# Patient Record
Sex: FEMALE | Race: WHITE | NOT HISPANIC OR LATINO | URBAN - METROPOLITAN AREA
[De-identification: names, ages, dates, MRNs, and addresses within clinical notes are randomized per-mention and may not be internally consistent; named-entity substitution may affect disease eponyms.]

---

## 2017-04-07 ENCOUNTER — OUTPATIENT (OUTPATIENT)
Dept: OUTPATIENT SERVICES | Facility: HOSPITAL | Age: 57
LOS: 1 days | Discharge: HOME | End: 2017-04-07

## 2017-06-27 DIAGNOSIS — S83.90XA SPRAIN OF UNSPECIFIED SITE OF UNSPECIFIED KNEE, INITIAL ENCOUNTER: ICD-10-CM

## 2018-07-18 ENCOUNTER — OUTPATIENT (OUTPATIENT)
Dept: OUTPATIENT SERVICES | Facility: HOSPITAL | Age: 58
LOS: 1 days | Discharge: HOME | End: 2018-07-18

## 2018-07-18 DIAGNOSIS — Z12.31 ENCOUNTER FOR SCREENING MAMMOGRAM FOR MALIGNANT NEOPLASM OF BREAST: ICD-10-CM

## 2018-09-01 ENCOUNTER — OUTPATIENT (OUTPATIENT)
Dept: OUTPATIENT SERVICES | Facility: HOSPITAL | Age: 58
LOS: 1 days | Discharge: HOME | End: 2018-09-01

## 2018-09-01 DIAGNOSIS — G47.30 SLEEP APNEA, UNSPECIFIED: ICD-10-CM

## 2018-09-01 DIAGNOSIS — R30.0 DYSURIA: ICD-10-CM

## 2018-09-01 DIAGNOSIS — E04.2 NONTOXIC MULTINODULAR GOITER: ICD-10-CM

## 2018-09-01 DIAGNOSIS — E66.01 MORBID (SEVERE) OBESITY DUE TO EXCESS CALORIES: ICD-10-CM

## 2018-10-16 ENCOUNTER — TRANSCRIPTION ENCOUNTER (OUTPATIENT)
Age: 58
End: 2018-10-16

## 2019-09-30 PROBLEM — Z00.00 ENCOUNTER FOR PREVENTIVE HEALTH EXAMINATION: Status: ACTIVE | Noted: 2019-09-30

## 2019-10-31 ENCOUNTER — APPOINTMENT (OUTPATIENT)
Dept: UROLOGY | Facility: CLINIC | Age: 59
End: 2019-10-31
Payer: COMMERCIAL

## 2019-10-31 DIAGNOSIS — Z80.49 FAMILY HISTORY OF MALIGNANT NEOPLASM OF OTHER GENITAL ORGANS: ICD-10-CM

## 2019-10-31 DIAGNOSIS — Z86.69 PERSONAL HISTORY OF OTHER DISEASES OF THE NERVOUS SYSTEM AND SENSE ORGANS: ICD-10-CM

## 2019-10-31 DIAGNOSIS — Z78.9 OTHER SPECIFIED HEALTH STATUS: ICD-10-CM

## 2019-10-31 DIAGNOSIS — Z80.42 FAMILY HISTORY OF MALIGNANT NEOPLASM OF PROSTATE: ICD-10-CM

## 2019-10-31 PROCEDURE — 99204 OFFICE O/P NEW MOD 45 MIN: CPT

## 2019-10-31 RX ORDER — MIRABEGRON 25 MG/1
25 TABLET, FILM COATED, EXTENDED RELEASE ORAL
Qty: 90 | Refills: 3 | Status: ACTIVE | COMMUNITY
Start: 2019-10-31 | End: 1900-01-01

## 2019-10-31 RX ORDER — BUPROPION HYDROCHLORIDE 100 MG/1
TABLET, FILM COATED ORAL
Refills: 0 | Status: ACTIVE | COMMUNITY

## 2019-10-31 NOTE — HISTORY OF PRESENT ILLNESS
[FreeTextEntry1] : TREVOR MARTIN is a 59 year old female P3 who presents with urinary incontinence, OAB. \par Patient reports in the last few years she has had worsening urinary incontinence. Reports urinary incontinence with stress maneuvers. Uses 3ppd\par Also complains of increasing frequency every one to 2 hours and urgency. Denies urge incontinence and has nocturia one time.\par She tried VESIcare and had some improvement in symptoms however stopped his medicine because she had recurring urinary infections ~3.\par Denies gross hematuria, dysuria or associated symptoms. 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

## 2019-10-31 NOTE — ASSESSMENT
[FreeTextEntry1] : TREVOR MARTIN is a 59 year old female P3 who presents with urinary incontinence, OAB, recurrent UTIs.\par Stress urinary incontinence is most bothersome at this point therefore recommending kegels, weight loss. If no improvement referral to DR Morataya consider sling\par Decrease bladder irritants\par Urinalysis urine culture\par Renal bladder ultrasound\par Mirabegron 25 mg, discussed htn side effect\par Theracran for recurrent UTIs. consider vaginal estrogen in future

## 2019-11-01 ENCOUNTER — LABORATORY RESULT (OUTPATIENT)
Age: 59
End: 2019-11-01

## 2019-11-01 ENCOUNTER — OUTPATIENT (OUTPATIENT)
Dept: OUTPATIENT SERVICES | Facility: HOSPITAL | Age: 59
LOS: 1 days | Discharge: HOME | End: 2019-11-01

## 2019-11-01 DIAGNOSIS — R35.0 FREQUENCY OF MICTURITION: ICD-10-CM

## 2019-11-01 LAB
APPEARANCE: ABNORMAL
BILIRUBIN URINE: NEGATIVE
BLOOD URINE: NEGATIVE
COLOR: NORMAL
GLUCOSE QUALITATIVE U: NEGATIVE
KETONES URINE: NEGATIVE
LEUKOCYTE ESTERASE URINE: NEGATIVE
NITRITE URINE: NEGATIVE
PH URINE: 8
PROTEIN URINE: NEGATIVE
SPECIFIC GRAVITY URINE: 1.01
UROBILINOGEN URINE: NORMAL

## 2019-11-04 LAB — BACTERIA UR CULT: ABNORMAL

## 2019-11-15 ENCOUNTER — MESSAGE (OUTPATIENT)
Age: 59
End: 2019-11-15

## 2019-11-21 ENCOUNTER — MESSAGE (OUTPATIENT)
Age: 59
End: 2019-11-21

## 2019-12-13 ENCOUNTER — FORM ENCOUNTER (OUTPATIENT)
Age: 59
End: 2019-12-13

## 2019-12-14 ENCOUNTER — OUTPATIENT (OUTPATIENT)
Dept: OUTPATIENT SERVICES | Facility: HOSPITAL | Age: 59
LOS: 1 days | Discharge: HOME | End: 2019-12-14
Payer: COMMERCIAL

## 2019-12-14 DIAGNOSIS — R35.0 FREQUENCY OF MICTURITION: ICD-10-CM

## 2019-12-14 PROCEDURE — 76770 US EXAM ABDO BACK WALL COMP: CPT | Mod: 26

## 2020-01-30 ENCOUNTER — APPOINTMENT (OUTPATIENT)
Dept: UROLOGY | Facility: CLINIC | Age: 60
End: 2020-01-30
Payer: COMMERCIAL

## 2020-01-30 DIAGNOSIS — R39.15 URGENCY OF URINATION: ICD-10-CM

## 2020-01-30 DIAGNOSIS — R35.0 FREQUENCY OF MICTURITION: ICD-10-CM

## 2020-01-30 DIAGNOSIS — N39.3 STRESS INCONTINENCE (FEMALE) (MALE): ICD-10-CM

## 2020-01-30 PROCEDURE — 99214 OFFICE O/P EST MOD 30 MIN: CPT

## 2020-01-30 NOTE — HISTORY OF PRESENT ILLNESS
[FreeTextEntry1] : TREVOR MARTIN is a 59 year old female P3 who presents with urinary incontinence, OAB, recurrent UTIs.\par Last visit we started mirabegron 25 mg and pt reports significant improvement.\par Urinary frequency and urgency has improved. Voids every 1.5 hours without urgency. Using 1-2 packs per day from 3 packs per day prior. No side effects.Still having YONG>UUI\par  \par Renal bladder ultrasound images visualized reviewed there are no kidney tumors no hydronephrosis no stones. Postvoid residual 67 mL.\par UA no microheme\par UCx asymptomatic bacteriuria not txed\par Prev- She tried VESIcare and had some improvement in symptoms however stopped his medicine because she had recurring urinary infections ~3.\par Denies gross hematuria, dysuria or associated symptoms. 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

## 2020-01-30 NOTE — ASSESSMENT
[FreeTextEntry1] : TREVOR MARTIN is a 59 year old female P3 who presents with urinary incontinence, OAB, recurrent UTIs.\par Last visit we started mirabegron 25 mg and pt reports significant improvement.\par /70 today\par Stress urinary incontinence is most bothersome at this point therefore recommending kegels, weight loss. If no improvement referral to DR Morataya consider sling, bulking agents.\par Decrease bladder irritants\par cont kegels\par wt loss\par Fu prn , pcp appt given

## 2020-10-26 PROBLEM — Z86.39 HISTORY OF THYROID NODULE: Status: RESOLVED | Noted: 2020-10-26 | Resolved: 2020-10-26

## 2020-10-26 PROBLEM — Z87.448 HISTORY OF STRESS INCONTINENCE: Status: RESOLVED | Noted: 2020-10-26 | Resolved: 2020-10-26

## 2020-10-27 ENCOUNTER — APPOINTMENT (OUTPATIENT)
Dept: GYNECOLOGIC ONCOLOGY | Facility: CLINIC | Age: 60
End: 2020-10-27
Payer: COMMERCIAL

## 2020-10-27 VITALS
TEMPERATURE: 98.7 F | HEIGHT: 67 IN | DIASTOLIC BLOOD PRESSURE: 86 MMHG | SYSTOLIC BLOOD PRESSURE: 134 MMHG | WEIGHT: 290 LBS | BODY MASS INDEX: 45.52 KG/M2

## 2020-10-27 DIAGNOSIS — Z87.448 PERSONAL HISTORY OF OTHER DISEASES OF URINARY SYSTEM: ICD-10-CM

## 2020-10-27 DIAGNOSIS — Z86.39 PERSONAL HISTORY OF OTHER ENDOCRINE, NUTRITIONAL AND METABOLIC DISEASE: ICD-10-CM

## 2020-10-27 DIAGNOSIS — N39.0 URINARY TRACT INFECTION, SITE NOT SPECIFIED: ICD-10-CM

## 2020-10-27 DIAGNOSIS — N32.81 OVERACTIVE BLADDER: ICD-10-CM

## 2020-10-27 DIAGNOSIS — N95.0 POSTMENOPAUSAL BLEEDING: ICD-10-CM

## 2020-10-27 DIAGNOSIS — R93.89 ABNORMAL FINDINGS ON DIAGNOSTIC IMAGING OF OTHER SPECIFIED BODY STRUCTURES: ICD-10-CM

## 2020-10-27 DIAGNOSIS — R35.0 FREQUENCY OF MICTURITION: ICD-10-CM

## 2020-10-27 PROCEDURE — 99072 ADDL SUPL MATRL&STAF TM PHE: CPT

## 2020-10-27 PROCEDURE — 99204 OFFICE O/P NEW MOD 45 MIN: CPT

## 2020-10-27 NOTE — HISTORY OF PRESENT ILLNESS
[FreeTextEntry1] : 60 year old female   via , referred by Dr. Mandi Oliva for post menopausal bleeding and a thickened endometrium  to 8mm.  Dr. Oliva performed an endometrial biopsy in her office but was unable to obtain endometrial cells   Patient had  x3. Menarche at age 9 / Menopause age 56.  \par Patient concerned since her Mother had a history of endometrial cancer. Her CA-125 is negative (13). \par However she reports a positive history of PMB in  and again in . she is here to discuss her options for further management.

## 2020-10-27 NOTE — PAST MEDICAL HISTORY
[Postmenopausal] : The patient is postmenopausal [Menarche Age ____] : age at menarche was [unfilled] [Menopause Age____] : age at menopause was [unfilled] [Approximately ___] : the LMP was approximately [unfilled] [Total Preg ___] : G[unfilled] [Live Births ___] : P[unfilled]  [Full Term ___] : Full Term: [unfilled] [Living ___] : Living: [unfilled] [FreeTextEntry5] :  x3

## 2020-11-19 ENCOUNTER — TRANSCRIPTION ENCOUNTER (OUTPATIENT)
Age: 60
End: 2020-11-19

## 2020-11-19 ENCOUNTER — APPOINTMENT (OUTPATIENT)
Dept: GASTROENTEROLOGY | Facility: CLINIC | Age: 60
End: 2020-11-19
Payer: COMMERCIAL

## 2020-11-19 DIAGNOSIS — K62.5 HEMORRHAGE OF ANUS AND RECTUM: ICD-10-CM

## 2020-11-19 DIAGNOSIS — R19.7 DIARRHEA, UNSPECIFIED: ICD-10-CM

## 2020-11-19 DIAGNOSIS — K58.1 IRRITABLE BOWEL SYNDROME WITH CONSTIPATION: ICD-10-CM

## 2020-11-19 DIAGNOSIS — R10.9 UNSPECIFIED ABDOMINAL PAIN: ICD-10-CM

## 2020-11-19 PROCEDURE — 99204 OFFICE O/P NEW MOD 45 MIN: CPT | Mod: 95

## 2020-11-19 RX ORDER — HYOSCYAMINE SULFATE 0.12 MG/1
0.12 TABLET ORAL
Refills: 0 | Status: ACTIVE | COMMUNITY

## 2020-11-19 RX ORDER — DICYCLOMINE HYDROCHLORIDE 10 MG/1
10 CAPSULE ORAL 3 TIMES DAILY
Qty: 90 | Refills: 3 | Status: ACTIVE | COMMUNITY
Start: 2020-11-19 | End: 1900-01-01

## 2020-11-19 NOTE — PHYSICAL EXAM
[General Appearance - Alert] : alert [Hearing Threshold Finger Rub Not Kaufman] : hearing was normal [] : no respiratory distress [Oriented To Time, Place, And Person] : oriented to person, place, and time

## 2020-11-19 NOTE — ASSESSMENT
FreeTextEntry1] : 60 year old female patient above average risk for CRC (aunt had CRC at 59) presents with 4 days abdominal pain and diarrhea that turned into bloody. Also had nausea but no vomiting. No weight loss. Hx of H Pylori \par Last colonoscopy was 2 years ago by Dr Morris. \par \par likely acute colitis, ? hemorrhoidal bleeding.\par Advised bland diet\par bentyl 10 tid\par Instructed if not better, to head to ED for a CT scan\par Needs colonoscopy in 4-6 weeks (r/o colon polyp/tumor/UC...), pt not interested, will call back of she changes her mind.

## 2020-11-19 NOTE — HISTORY OF PRESENT ILLNESS
[Home] : at home, [unfilled] , at the time of the visit. [Medical Office: ([facility])___] : at the medical office located in  [Verbal consent obtained from patient] : the patient, [unfilled] [FreeTextEntry4] : Nayana Cooper [de-identified] : 60 year old female patient above average risk for CRC (aunt had CRC at 59) presents with 4 days abdominal pain and diarrhea that turned into bloody. Also had nausea but no vomiting. No weight loss. Hx of H Pylori \par Last colonoscopy was 2 years ago by Dr Morris.

## 2020-12-18 DIAGNOSIS — R73.09 OTHER ABNORMAL GLUCOSE: ICD-10-CM

## 2020-12-18 RX ORDER — SEMAGLUTIDE 1.34 MG/ML
2 INJECTION, SOLUTION SUBCUTANEOUS WEEKLY
Qty: 1 | Refills: 2 | Status: ACTIVE | COMMUNITY
Start: 2020-12-18 | End: 1900-01-01

## 2020-12-27 ENCOUNTER — TRANSCRIPTION ENCOUNTER (OUTPATIENT)
Age: 60
End: 2020-12-27

## 2021-01-05 ENCOUNTER — TRANSCRIPTION ENCOUNTER (OUTPATIENT)
Age: 61
End: 2021-01-05

## 2021-02-06 ENCOUNTER — LABORATORY RESULT (OUTPATIENT)
Age: 61
End: 2021-02-06

## 2021-03-13 ENCOUNTER — OUTPATIENT (OUTPATIENT)
Dept: OUTPATIENT SERVICES | Facility: HOSPITAL | Age: 61
LOS: 1 days | Discharge: HOME | End: 2021-03-13
Payer: COMMERCIAL

## 2021-03-13 ENCOUNTER — RESULT REVIEW (OUTPATIENT)
Age: 61
End: 2021-03-13

## 2021-03-13 DIAGNOSIS — Z12.31 ENCOUNTER FOR SCREENING MAMMOGRAM FOR MALIGNANT NEOPLASM OF BREAST: ICD-10-CM

## 2021-03-13 PROCEDURE — 77063 BREAST TOMOSYNTHESIS BI: CPT | Mod: 26

## 2021-03-13 PROCEDURE — 77067 SCR MAMMO BI INCL CAD: CPT | Mod: 26

## 2021-08-14 ENCOUNTER — LABORATORY RESULT (OUTPATIENT)
Age: 61
End: 2021-08-14

## 2022-02-16 ENCOUNTER — RESULT REVIEW (OUTPATIENT)
Age: 62
End: 2022-02-16

## 2022-02-16 ENCOUNTER — OUTPATIENT (OUTPATIENT)
Dept: OUTPATIENT SERVICES | Facility: HOSPITAL | Age: 62
LOS: 1 days | Discharge: HOME | End: 2022-02-16
Payer: COMMERCIAL

## 2022-02-16 DIAGNOSIS — R10.2 PELVIC AND PERINEAL PAIN: ICD-10-CM

## 2022-02-16 PROCEDURE — 76856 US EXAM PELVIC COMPLETE: CPT | Mod: 26

## 2022-02-16 PROCEDURE — 76830 TRANSVAGINAL US NON-OB: CPT | Mod: 26

## 2022-07-15 DIAGNOSIS — Z20.822 CONTACT WITH AND (SUSPECTED) EXPOSURE TO COVID-19: ICD-10-CM

## 2022-07-18 LAB — SARS-COV-2 N GENE NPH QL NAA+PROBE: NOT DETECTED

## 2022-07-22 ENCOUNTER — OUTPATIENT (OUTPATIENT)
Dept: OUTPATIENT SERVICES | Facility: HOSPITAL | Age: 62
LOS: 1 days | Discharge: HOME | End: 2022-07-22

## 2022-07-22 DIAGNOSIS — N85.00 ENDOMETRIAL HYPERPLASIA, UNSPECIFIED: ICD-10-CM

## 2022-07-22 PROCEDURE — 76830 TRANSVAGINAL US NON-OB: CPT | Mod: 26

## 2022-12-03 ENCOUNTER — NON-APPOINTMENT (OUTPATIENT)
Age: 62
End: 2022-12-03

## 2023-01-16 ENCOUNTER — APPOINTMENT (OUTPATIENT)
Dept: ORTHOPEDIC SURGERY | Facility: CLINIC | Age: 63
End: 2023-01-16
Payer: COMMERCIAL

## 2023-01-16 VITALS — BODY MASS INDEX: 43.79 KG/M2 | WEIGHT: 279 LBS | HEIGHT: 67 IN

## 2023-01-16 PROCEDURE — 99203 OFFICE O/P NEW LOW 30 MIN: CPT

## 2023-01-16 PROCEDURE — 73560 X-RAY EXAM OF KNEE 1 OR 2: CPT | Mod: 50

## 2023-01-16 RX ORDER — NAPROXEN 500 MG/1
500 TABLET ORAL
Qty: 60 | Refills: 0 | Status: ACTIVE | COMMUNITY
Start: 2023-01-16 | End: 1900-01-01

## 2023-01-16 NOTE — PHYSICAL EXAM
[Right] : right knee [Equivocal] : equivocal Pooja [] : no extensor lag [FreeTextEntry3] : Healed abrasion anterior knee [FreeTextEntry9] : Good range of motion with minimal pain to anterior knee [de-identified] : Good strength throughout

## 2023-01-16 NOTE — DATA REVIEWED
[FreeTextEntry1] : X-rays bilateral knees: No acute fractures, subluxations, dislocations.  Moderate osteoarthritis

## 2023-01-16 NOTE — HISTORY OF PRESENT ILLNESS
[de-identified] : Patient is a 63-year-old female here for evaluation of right knee injury.  Patient states on 12/1/2022 she tripped over a wet cement and her knee fell directly onto concrete.  Patient was immediate pain and had abrasion.  Patient states since then abrasion has healed and she has been slightly progressing but still has pain especially when standing long peers of time, walking long distances, going up and down stairs.  Patient states sometimes the knee feels slightly unstable.  Denies numbness/tingling.

## 2023-01-16 NOTE — DISCUSSION/SUMMARY
[de-identified] : Discussed x-rays with patient, negative for acute fracture.  Patient has bone contusion of the right knee.  Discussed that bone contusion sometimes can take 6 to 8 weeks to fully heal, usually the first 2 weeks of the worst.  Discussed treatment options detail including rest, anti-inflammatory medication, ice/heat, compression sleeve, physical therapy.  Physical therapy prescription given.  Naproxen 500 mg twice daily to the patient's pharmacy, discussed side effects.  Advised patient call if symptoms worsen or change.  Patient will follow up in 6 weeks for reevaluation, possible cortisone injection.  Seen in supervision of Dr. Tristan.

## 2023-02-24 ENCOUNTER — APPOINTMENT (OUTPATIENT)
Dept: ORTHOPEDIC SURGERY | Facility: CLINIC | Age: 63
End: 2023-02-24

## 2023-03-03 ENCOUNTER — APPOINTMENT (OUTPATIENT)
Dept: ORTHOPEDIC SURGERY | Facility: CLINIC | Age: 63
End: 2023-03-03

## 2023-03-17 ENCOUNTER — RESULT REVIEW (OUTPATIENT)
Age: 63
End: 2023-03-17

## 2023-03-17 ENCOUNTER — OUTPATIENT (OUTPATIENT)
Dept: OUTPATIENT SERVICES | Facility: HOSPITAL | Age: 63
LOS: 1 days | End: 2023-03-17
Payer: COMMERCIAL

## 2023-03-17 DIAGNOSIS — Z00.8 ENCOUNTER FOR OTHER GENERAL EXAMINATION: ICD-10-CM

## 2023-03-17 DIAGNOSIS — Z12.31 ENCOUNTER FOR SCREENING MAMMOGRAM FOR MALIGNANT NEOPLASM OF BREAST: ICD-10-CM

## 2023-03-17 DIAGNOSIS — Z13.820 ENCOUNTER FOR SCREENING FOR OSTEOPOROSIS: ICD-10-CM

## 2023-03-17 PROCEDURE — 77067 SCR MAMMO BI INCL CAD: CPT

## 2023-03-17 PROCEDURE — 77063 BREAST TOMOSYNTHESIS BI: CPT | Mod: 26

## 2023-03-17 PROCEDURE — 77067 SCR MAMMO BI INCL CAD: CPT | Mod: 26

## 2023-03-17 PROCEDURE — 77080 DXA BONE DENSITY AXIAL: CPT

## 2023-03-17 PROCEDURE — 77063 BREAST TOMOSYNTHESIS BI: CPT

## 2023-03-18 DIAGNOSIS — Z13.820 ENCOUNTER FOR SCREENING FOR OSTEOPOROSIS: ICD-10-CM

## 2023-03-18 DIAGNOSIS — Z12.31 ENCOUNTER FOR SCREENING MAMMOGRAM FOR MALIGNANT NEOPLASM OF BREAST: ICD-10-CM

## 2023-09-18 ENCOUNTER — APPOINTMENT (OUTPATIENT)
Dept: ORTHOPEDIC SURGERY | Facility: CLINIC | Age: 63
End: 2023-09-18
Payer: COMMERCIAL

## 2023-09-18 VITALS — WEIGHT: 265 LBS | HEIGHT: 67 IN | BODY MASS INDEX: 41.59 KG/M2

## 2023-09-18 DIAGNOSIS — S80.01XA CONTUSION OF RIGHT KNEE, INITIAL ENCOUNTER: ICD-10-CM

## 2023-09-18 DIAGNOSIS — S80.02XA CONTUSION OF LEFT KNEE, INITIAL ENCOUNTER: ICD-10-CM

## 2023-09-18 DIAGNOSIS — M54.50 LOW BACK PAIN, UNSPECIFIED: ICD-10-CM

## 2023-09-18 PROCEDURE — 73502 X-RAY EXAM HIP UNI 2-3 VIEWS: CPT

## 2023-09-18 PROCEDURE — 99213 OFFICE O/P EST LOW 20 MIN: CPT

## 2023-09-18 PROCEDURE — 72100 X-RAY EXAM L-S SPINE 2/3 VWS: CPT

## 2023-09-18 PROCEDURE — 73564 X-RAY EXAM KNEE 4 OR MORE: CPT | Mod: 50

## 2023-09-18 PROCEDURE — 99203 OFFICE O/P NEW LOW 30 MIN: CPT

## 2023-11-10 ENCOUNTER — APPOINTMENT (OUTPATIENT)
Dept: ORTHOPEDIC SURGERY | Facility: CLINIC | Age: 63
End: 2023-11-10
Payer: COMMERCIAL

## 2023-11-10 DIAGNOSIS — M25.552 PAIN IN LEFT HIP: ICD-10-CM

## 2023-11-10 PROCEDURE — 99213 OFFICE O/P EST LOW 20 MIN: CPT

## 2023-12-04 ENCOUNTER — APPOINTMENT (OUTPATIENT)
Dept: ORTHOPEDIC SURGERY | Facility: CLINIC | Age: 63
End: 2023-12-04
Payer: COMMERCIAL

## 2023-12-04 DIAGNOSIS — M48.062 SPINAL STENOSIS, LUMBAR REGION WITH NEUROGENIC CLAUDICATION: ICD-10-CM

## 2023-12-04 PROCEDURE — 99204 OFFICE O/P NEW MOD 45 MIN: CPT

## 2023-12-19 ENCOUNTER — APPOINTMENT (OUTPATIENT)
Dept: MRI IMAGING | Facility: CLINIC | Age: 63
End: 2023-12-19
Payer: COMMERCIAL

## 2023-12-19 PROCEDURE — 72148 MRI LUMBAR SPINE W/O DYE: CPT

## 2024-01-29 ENCOUNTER — APPOINTMENT (OUTPATIENT)
Dept: ORTHOPEDIC SURGERY | Facility: CLINIC | Age: 64
End: 2024-01-29
Payer: COMMERCIAL

## 2024-01-29 DIAGNOSIS — M54.50 LOW BACK PAIN, UNSPECIFIED: ICD-10-CM

## 2024-01-29 PROCEDURE — 99213 OFFICE O/P EST LOW 20 MIN: CPT

## 2024-01-29 NOTE — DATA REVIEWED
[FreeTextEntry1] : I reviewed the patient's MRI of the lumbar spine that was done recently at our facility on December 19, 2023.  This demonstrated L5-S1 significant disc degeneration and collapse of the disc space with foraminal stenosis at L5-S1.

## 2024-01-29 NOTE — DISCUSSION/SUMMARY
[de-identified] : 64-year-old female presents with lumbar radiculopathy symptoms secondary likely to foraminal stenosis at L5-S1.  I am giving the patient prescription physical therapy.  She will follow-up with Dr. Lara from interventional pain management to talk about injections.

## 2024-01-29 NOTE — HISTORY OF PRESENT ILLNESS
[de-identified] : 64-year-old female presents for follow-up.  She continues to have some of the pain.  Physical therapy has been helping her.  10 years ago she had an injection which gave her significant pain relief from her radicular symptoms.  She is interested in trying this again.

## 2024-02-29 ENCOUNTER — APPOINTMENT (OUTPATIENT)
Dept: PAIN MANAGEMENT | Facility: CLINIC | Age: 64
End: 2024-02-29
Payer: COMMERCIAL

## 2024-02-29 PROCEDURE — 99204 OFFICE O/P NEW MOD 45 MIN: CPT

## 2024-02-29 NOTE — HISTORY OF PRESENT ILLNESS
[FreeTextEntry1] : This is a 64-year-old female here to establish care for lower back pain the pain is associated with radicular components into the bilateral lower extremities, R>L. The pain travels down into the ankles and usually occurs when she walks long distances. Pain is associated with tingling in the legs. She is referred to me by Dr. Raines for further evaluation. She has been attending physical therapy sessions which provide her with minimal relief of her symptoms. She notes that she underwent injection therapy about 10 years ago which provided her with good relief of her symptoms. The MRI of the lumbar spine was reviewed with the patient and is documented below. The option to repeat injection therapy was discussed and she is eager to proceed.

## 2024-02-29 NOTE — PHYSICAL EXAM
[Normal Coordination] : normal coordination [Normal DTR UE/LE] : normal DTR UE/LE  [Normal Mood and Affect] : normal mood and affect [Normal Sensation] : normal sensation [Able to Communicate] : able to communicate [Well Nourished] : well nourished [Well Developed] : well developed [Flexion] : flexion [Extension] : extension [] : negative sitting straight leg raise

## 2024-02-29 NOTE — ASSESSMENT
[FreeTextEntry1] : This is a 64-year-old female with complaints of lower back pain with radicular components into the bilateral legs, R>L. Pain is associated tingling in the legs. Her pain is increased with walking long distances. The pain has not responded to conservative care, including medications, stretching, as well as active modalities, such as physical therapy.  Imaging studies as well as physical exam findings corroborate the symptomatology. We will proceed with a B/L L5-S1 TFESI x1 w/ mac and she will follow up afterwards for reassessment. All this patients questions were answered and the conversation was understood well.  Patient had a MRI that shows a radicular component along with pain referred into the lower extremities. Patient has trialed rehab (home exercise, physical therapy or chiropractic care) and medications. I will schedule a bilateral L5-S1 TFESI.  The patient has severe anxiety of procedures that necessitates monitored anesthesia care (MAC). The procedure performed will be close to major nerves, arteries, and spinal cord and/or joint structures. Due to the proximity of these structures, we need the patient to be still during the procedure. With the help of MAC, this will be safely achieved and decrease the risk of any complications.  RISK AND BENEFIT PARAGRAPH: Risk, benefits, pros and cons of procedure were explained to the patient using models and diagrams and their questions were answered.  I, Asmita Morris, attest that this documentation has been prepared under the direction and in the presence of Provider Yaritza Lara MD.   Thank you for allowing me to assist in the management of this patient.    Best Regards,    Yaritza Lara M.D., FAAPMR   Diplomate, American Board of Physical Medicine and Rehabilitation Diplomate, American Board of Pain Medicine  Diplomate, American Board of Pain Managementh

## 2024-02-29 NOTE — DATA REVIEWED
[FreeTextEntry1] : MRI of the lumbar spine dated 12/19/2023 demonstrates mild spinal curvature with rotary component and preservation of normal sagittal lordosis.  Widely patent spinal canal.  Multilevel spondylosis and multilevel facet hypertrophy. L2-3 small joint effusions bilaterally.  Mild foraminal narrowing L5-S1, L4-5 and L3-4.  Lumbar x-rays from September 2023. Those showed diffuse to space collapse L5-S1.  UDS: No data obtained today.  [place] REGISTRY: Checked.

## 2024-03-29 ENCOUNTER — APPOINTMENT (OUTPATIENT)
Dept: PAIN MANAGEMENT | Facility: CLINIC | Age: 64
End: 2024-03-29
Payer: COMMERCIAL

## 2024-03-29 PROCEDURE — 93040 RHYTHM ECG WITH REPORT: CPT | Mod: 79

## 2024-03-29 PROCEDURE — 94761 N-INVAS EAR/PLS OXIMETRY MLT: CPT

## 2024-03-29 PROCEDURE — 64483 NJX AA&/STRD TFRM EPI L/S 1: CPT | Mod: 50

## 2024-03-29 PROCEDURE — 93770 DETERMINATION VENOUS PRESS: CPT | Mod: 59

## 2024-03-29 PROCEDURE — 00630 ANES PX LUMBAR REGION NOS: CPT | Mod: QZ,P2

## 2024-03-29 NOTE — PROCEDURE
[FreeTextEntry3] : SELECTIVE TRANSFORAMINAL LUMBAR EPIDURAL NERVE ROOT INJECTION UNDER FLUOROSCOPY     Date:  2024  Patient: Annabel Edwards  :  1960  Preoperative Diagnosis: L5 Radiculitis  Procedure: Selective Bilateral L5-S1 Transforaminal Lumbar Epidural Nerve Root Injection under Fluoroscopy  Physician: Yaritza Lara MD, FAAPMR     Anesthesiologist/CRNA: Mr. Jasso  Anesthesia: See nurses notes/MAC/Cold spray. Versed 3 mg IVP     Medical Necessity:  Failure of conservative management.  Indication for Fluoroscopy:  This procedure requires the precise placement of the spinal needle into the specific paravertebral foramen.  It is the only way to accurately and safely perform the injection.   CONSENT: The possible complications including infection, bleeding, nerve damage, Hospital admission, death or failure of the procedure; though unusual, are theoretically possible. The patient was educated about the of the procedure and alternative therapies. All questions were answered and the patient freely gave consent to proceed.   Monitoring:  Patient had continuous blood pressure, EKG, and pulse oximetry throughout the case. See nurse's notes.   PROCEDURE NOTE:  After obtaining written consent, the patient was then positioned on the fluoroscopy table in the prone position with a pillow beneath the pelvis to reduce lumbar lordosis. The lumbar area was prepped with betadine solution and draped in the usual manner. The fluoroscope was used to identify the L3///L4///L5 vertebral body on the AP projection. It was then rotated into an oblique projection until the superior articulating process of the S1 (inferior) vertebra is projected beneath the 6 o'clock position of the L5 (superior) vertebrae. The 22 gauge 5inch needle was inserted in the skin at a point overlying the superior articulating process of the inferior vertebra and aimed for the 6 o'clock position of the superior vertebrae's pedicle.  After the needle contacted bone, a lateral projection was obtained to insure that the needle tip was in proximity with the vertebral body. Paresthesias were not noted.  One ml of Omnipaque 240 was injected and a neurogram wasr spine AP and oblique views with x-ray no degenerative changes noted.  obtained. Following demonstration of the neurogram, 1 ml of Preservative free normal saline and 1 ml of depomedrol (40mg) was injected. The small volume and relatively high concentration was chosen to preserve selectivity and diagnostic value of the injection. There was no CSF nor heme identified. The contralateral side was injected in identical fashion. There were no signs of, intravascular block or hypotension. The needle was removed intact. A band aid was place on the site.     Epidurogram: The nerve root was observed in its outline on the neurogram. Distal and proximal spread was noted pointing away from epidural fibrosis/scarring.    Complications: None. The patient tolerated the procedure well.      Disposition: I have examined the patient and there are no new physical findings since the original presentation.  Sensory and motor function were intact. The patient met discharge criteria see nurses notes. The discharge instruction sheet was reviewed and given to the patient. The patient was discharged home with a .  If patient gets sustained relief will have patient do modified planks 3 times a day on carpet or yoga mat starting at 5 seconds building up to 1 minute without grimacing/Valsalva and walking.      Comment: 1st SNRI today, depending on effectiveness would schedule 2nd SNRI in 1-2 weeks vs caudal epidural steroid vs follow up in office depending on insurances. Follow up office. Call if any problems      This document was electronically signed by:    Yaritza Lara MD, FAAPMR Diplomate, American Board of Physical Medicine and Rehabilitation Diplomate, American Board of Pain Medicine

## 2024-04-11 ENCOUNTER — APPOINTMENT (OUTPATIENT)
Dept: PAIN MANAGEMENT | Facility: CLINIC | Age: 64
End: 2024-04-11
Payer: COMMERCIAL

## 2024-04-11 ENCOUNTER — APPOINTMENT (OUTPATIENT)
Dept: PAIN MANAGEMENT | Facility: CLINIC | Age: 64
End: 2024-04-11

## 2024-04-11 DIAGNOSIS — M54.50 LOW BACK PAIN, UNSPECIFIED: ICD-10-CM

## 2024-04-11 DIAGNOSIS — M54.17 RADICULOPATHY, LUMBOSACRAL REGION: ICD-10-CM

## 2024-04-11 PROCEDURE — 99213 OFFICE O/P EST LOW 20 MIN: CPT

## 2024-04-11 NOTE — ASSESSMENT
[FreeTextEntry1] : This is a 64-year-old female with complaints of lower back pain with radicular components into the bilateral legs, R>L. Pain is associated tingling in the legs. Her pain is increased with walking long distances.  She is status post a B/L L5-S1 TFESI x1 w/ mac on 3/29/2024 with 90% sustained relief.  We will hold off on any further injections at this time.  Should her pain increase, she was instructed to call the office and schedule a reevaluation.   All this patients questions were answered and the conversation was understood well.   Thank you for allowing me to assist in the management of this patient.    Best Regards,    Yaritza Lara M.D., FAAPMR   Diplomate, American Board of Physical Medicine and Rehabilitation Diplomate, American Board of Pain Medicine

## 2024-04-11 NOTE — PHYSICAL EXAM
[Normal Coordination] : normal coordination [Normal DTR UE/LE] : normal DTR UE/LE  [Normal Sensation] : normal sensation [Normal Mood and Affect] : normal mood and affect [Able to Communicate] : able to communicate [Well Developed] : well developed [Well Nourished] : well nourished [Flexion] : flexion [Extension] : extension [] : patient ambulates without assistive device

## 2024-04-11 NOTE — HISTORY OF PRESENT ILLNESS
[FreeTextEntry1] : This telehealth visit was spent discussing the patients condition, and counseling the patient with regarding to medical decision making, which included the review of any relevant imaging studies with the patient as well as a discussion regarding the risks and benefits of treatment options with the patient.  ORIGINAL PRESENTATION : This is a 64-year-old female here to establish care for lower back pain the pain is associated with radicular components into the bilateral lower extremities, R>L. The pain travels down into the ankles and usually occurs when she walks long distances. Pain is associated with tingling in the legs. She is referred to me by Dr. Raines for further evaluation. She has been attending physical therapy sessions which provide her with minimal relief of her symptoms. She notes that she underwent injection therapy about 10 years ago which provided her with good relief of her symptoms. The MRI of the lumbar spine was reviewed with the patient and is documented below. The option to repeat injection therapy was discussed and she is eager to proceed.   PATIENT PRESENTS TODAY: For a follow-up visit via telemedicine.  She is status post a bilateral [id number] w/ MAC on 3/29/2024, and is happy to report 90% sustained relief.  She is satisfied with the level of relief that she has gotten therefore at this time we will hold off on any further injections.  She will continue sessions of physical therapy and I have recommended also a home exercise program.

## 2024-10-04 ENCOUNTER — RESULT REVIEW (OUTPATIENT)
Age: 64
End: 2024-10-04

## 2024-10-04 ENCOUNTER — OUTPATIENT (OUTPATIENT)
Dept: OUTPATIENT SERVICES | Facility: HOSPITAL | Age: 64
LOS: 1 days | End: 2024-10-04
Payer: COMMERCIAL

## 2024-10-04 DIAGNOSIS — E04.2 NONTOXIC MULTINODULAR GOITER: ICD-10-CM

## 2024-10-04 DIAGNOSIS — Z00.8 ENCOUNTER FOR OTHER GENERAL EXAMINATION: ICD-10-CM

## 2024-10-04 DIAGNOSIS — Z12.31 ENCOUNTER FOR SCREENING MAMMOGRAM FOR MALIGNANT NEOPLASM OF BREAST: ICD-10-CM

## 2024-10-04 PROCEDURE — 76536 US EXAM OF HEAD AND NECK: CPT | Mod: 26

## 2024-10-04 PROCEDURE — 77067 SCR MAMMO BI INCL CAD: CPT | Mod: 26

## 2024-10-04 PROCEDURE — 77063 BREAST TOMOSYNTHESIS BI: CPT

## 2024-10-04 PROCEDURE — 76536 US EXAM OF HEAD AND NECK: CPT

## 2024-10-04 PROCEDURE — 77067 SCR MAMMO BI INCL CAD: CPT

## 2024-10-04 PROCEDURE — 77063 BREAST TOMOSYNTHESIS BI: CPT | Mod: 26

## 2024-10-05 DIAGNOSIS — Z12.31 ENCOUNTER FOR SCREENING MAMMOGRAM FOR MALIGNANT NEOPLASM OF BREAST: ICD-10-CM

## 2024-10-05 DIAGNOSIS — E04.2 NONTOXIC MULTINODULAR GOITER: ICD-10-CM
